# Patient Record
(demographics unavailable — no encounter records)

---

## 2024-12-04 NOTE — HISTORY OF PRESENT ILLNESS
[de-identified] : Patient is s/p left knee patella tendon reconstruction for arthrofibrosis  Has been having anyterior knee pain  NAD Left knee: Incisions healed, c/d/i anterior pf ttp Mild swelling ROM 0-95 Neg Instability NVI Compartments soft and NT  s/p left knee patella tendon reconstruction went over findings explained surgery expained ant knee pain and PF chondromalacia tx options explained will send auth for left knee visco pt script

## 2025-03-24 NOTE — HISTORY OF PRESENT ILLNESS
[TextBox_4] : 62 yo F with PMHx of newly diagnosed DM on Metformin, HTN, TIA, presents with SOBOE, endorses feeling winded and out of breath after climbing 1 flight of stairs, patient able to ambulate on flat surface however walks slow secondary to right knee injury sp 4 surgeries.  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

## 2025-03-24 NOTE — REVIEW OF SYSTEMS
[Recent Wt Gain (___ Lbs)] : ~T recent [unfilled] lb weight gain [Chest Tightness] : no chest tightness [Wheezing] : no wheezing [SOB on Exertion] : sob on exertion [Negative] : Endocrine

## 2025-04-14 NOTE — DISCUSSION/SUMMARY
[FreeTextEntry1] : EKG performed today was unremarkable.  1. Dyspnea on Exertion:  - Recommend an echocardiogram to evaluate LV function and rule out valvular heart disease. - Recommend a pharmacologic nuclear stress test to rule out CAD as etiology of symptoms.   2. TIA/Palpitations: - Patient reports intermittent nondaily palpitations and has a h/o TIA.  - Recommend MCOT monitor to r/o PAF as etiology of symptoms.  - Referral provided to Dr. Pramod Petit () for evaluation of loop recorder.  3. HTN: - Recommend continuing Losartan 50mg PO QD and HCTZ 25mg PO QD. - Recommend keeping BP log at home to assess if any change in antihypertensive regimen is warranted.  - Other planned treatments include an exercise regimen, weight loss, low sodium diet, and alcohol moderation.  4. HLD: - Recommend continuing Lipitor 40mg PO QD. - Other planned treatment includes diet modification, exercise, and weight loss.  5. DM: - There are no changes in medication management. - Patient advised to continue taking medications as prescribed, closely monitor blood sugar at home, follow a diabetic diet, exercise, lose weight, and follow up for continued monitoring. - Discussed importance of diet and exercise to improve glycemic control.  Instructed to follow up after above testing, or sooner for new or worsening symptoms.  Plan was discussed with the patient.

## 2025-04-14 NOTE — ASSESSMENT
[FreeTextEntry1] : NADEEM YUSUF is a pleasant 61-year-old female, with a PMHx significant for HLD, DM, and prior TIA, who presents today for cardiovascular evaluation.

## 2025-04-14 NOTE — CARDIOLOGY SUMMARY
[de-identified] : 04/11/2025: NSR, (-) significant ST-T changes. =======================================================

## 2025-04-14 NOTE — REVIEW OF SYSTEMS
[Chest Discomfort] : no chest discomfort [Lower Ext Edema] : no extremity edema [Leg Claudication] : no intermittent leg claudication [Syncope] : no syncope [Cough] : no cough [FreeTextEntry5] : (+) STAHL, (+) Palpitations [FreeTextEntry6] : (+) STAHL

## 2025-04-14 NOTE — CARDIOLOGY SUMMARY
[de-identified] : 04/11/2025: NSR, (-) significant ST-T changes. =======================================================

## 2025-04-14 NOTE — HISTORY OF PRESENT ILLNESS
[FreeTextEntry1] : NADEEM YUSUF is a pleasant 61-year-old female, with a PMHx significant for HTN, HLD, DM, and prior TIA, who presents today for cardiovascular evaluation. The patient reports a several-month history of STAHL that occurs with walking short distances and up flights of stairs. Denies chest pain or other anginal symptoms during this time. The patient was diagnosed with a TIA in the past for unclear etiology. Also reports intermittent nondaily palpitations. Otherwise: (-) dizziness, (-) syncope.

## 2025-05-10 NOTE — HISTORY OF PRESENT ILLNESS
[FreeTextEntry1] : Cardio: Dr. Alston  61 year old female with recent admission for TIA presenting to discuss long-term rhythm monitoring. She endorses occasional palpitations. A recent MCOT with cardiology showed brief pSVT.    -EKG 5/1/2025: Sinus rhythm at 72 bpm

## 2025-05-10 NOTE — PHYSICAL EXAM
[Well Developed] : well developed [Well Nourished] : well nourished [Normal Conjunctiva] : normal conjunctiva [No Acute Distress] : no acute distress [No Carotid Bruit] : no carotid bruit [Normal Venous Pressure] : normal venous pressure [Normal S1, S2] : normal S1, S2 [No Murmur] : no murmur [No Rub] : no rub [No Gallop] : no gallop [Clear Lung Fields] : clear lung fields [No Respiratory Distress] : no respiratory distress  [Good Air Entry] : good air entry [Soft] : abdomen soft [Non Tender] : non-tender [No Masses/organomegaly] : no masses/organomegaly [Normal Bowel Sounds] : normal bowel sounds [No Edema] : no edema [Normal Gait] : normal gait [No Cyanosis] : no cyanosis [No Clubbing] : no clubbing [No Varicosities] : no varicosities [No Rash] : no rash [No Skin Lesions] : no skin lesions [No Focal Deficits] : no focal deficits [Moves all extremities] : moves all extremities [Normal Speech] : normal speech [Alert and Oriented] : alert and oriented [Normal memory] : normal memory

## 2025-05-10 NOTE — PHYSICAL EXAM
[Well Developed] : well developed [Well Nourished] : well nourished [Normal Conjunctiva] : normal conjunctiva [No Acute Distress] : no acute distress [Normal Venous Pressure] : normal venous pressure [No Carotid Bruit] : no carotid bruit [Normal S1, S2] : normal S1, S2 [No Murmur] : no murmur [No Gallop] : no gallop [No Rub] : no rub [Clear Lung Fields] : clear lung fields [Good Air Entry] : good air entry [No Respiratory Distress] : no respiratory distress  [Soft] : abdomen soft [Non Tender] : non-tender [Normal Bowel Sounds] : normal bowel sounds [No Masses/organomegaly] : no masses/organomegaly [No Edema] : no edema [Normal Gait] : normal gait [No Clubbing] : no clubbing [No Cyanosis] : no cyanosis [No Varicosities] : no varicosities [No Rash] : no rash [No Skin Lesions] : no skin lesions [No Focal Deficits] : no focal deficits [Moves all extremities] : moves all extremities [Alert and Oriented] : alert and oriented [Normal Speech] : normal speech [Normal memory] : normal memory

## 2025-05-10 NOTE — ASSESSMENT
[FreeTextEntry1] : The risks, benefits and alternatives of a loop recorder for long-term rhythm monitoring were discussed in details. I explained to the patient that if Afib is detected she will need to start anti-coagulation. We discussed the associated co-pays. All questions answered. Patient would like to consult with her family before proceeding.  Follow-up in 1 months or earlier as needed.

## 2025-05-12 NOTE — HISTORY OF PRESENT ILLNESS
[TextBox_4] : 5/12/25: Patient states she has been doing well, has not needed to reach for her albuterol. Continues to endorse SOBOE when climbing stairs only. SP HST  3/24/25: 62 yo F with PMHx of newly diagnosed DM on Metformin, HTN, TIA, presents with SOBOE, endorses feeling winded and out of breath after climbing 1 flight of stairs, patient able to ambulate on flat surface however walks slow secondary to right knee injury sp 4 surgeries.  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

## 2025-06-09 NOTE — HISTORY OF PRESENT ILLNESS
[Obstructive Sleep Apnea] : obstructive sleep apnea [Awakes Unrefreshed] : awakes unrefreshed [Fatigue] : fatigue [Recent  Weight Gain] : recent weight gain [Snoring] : snoring [TextBox_4] : 6/9/25: Patient SP titration study states it was the best sleep of her life, denies respiratory complaints, new insurance as of June 1st. CT Chest denied. Has not needed to reach for her albuterol though when she climbs the stairs quickly would experience SOB, SP recent stress test negative pending follow up with Cardiology.   5/12/25: Patient states she has been doing well, has not needed to reach for her albuterol. Continues to endorse SOBOE when climbing stairs only. SP HST  3/24/25: 62 yo F with PMHx of newly diagnosed DM on Metformin, HTN, TIA, presents with SOBOE, endorses feeling winded and out of breath after climbing 1 flight of stairs, patient able to ambulate on flat surface however walks slow secondary to right knee injury sp 4 surgeries.  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

## 2025-06-09 NOTE — PHYSICAL EXAM
[No Acute Distress] : no acute distress [Normal Oropharynx] : normal oropharynx [Normal Appearance] : normal appearance [Normal Rate/Rhythm] : normal rate/rhythm [No Neck Mass] : no neck mass [Normal S1, S2] : normal s1, s2 [No Murmurs] : no murmurs [No Resp Distress] : no resp distress [Clear to Auscultation Bilaterally] : clear to auscultation bilaterally [No Abnormalities] : no abnormalities [Normal Gait] : normal gait [Benign] : benign [No Cyanosis] : no cyanosis [No Clubbing] : no clubbing [No Edema] : no edema [Normal Color/ Pigmentation] : normal color/ pigmentation [FROM] : FROM [No Focal Deficits] : no focal deficits [Oriented x3] : oriented x3 [Normal Affect] : normal affect [TextBox_68] : no wheezing

## 2025-06-09 NOTE — REVIEW OF SYSTEMS
[Recent Wt Gain (___ Lbs)] : ~T recent [unfilled] lb weight gain [SOB on Exertion] : sob on exertion [Negative] : Endocrine [Chest Tightness] : no chest tightness [Wheezing] : no wheezing

## 2025-06-25 NOTE — HISTORY OF PRESENT ILLNESS
[FreeTextEntry1] : NADEEM YUSUF is a pleasant 61-year-old female, with a PMHx significant for HTN, HLD, DM, and prior TIA, who presents today for cardiovascular follow up. The patient reports a several-month history of STAHL that occurs with walking short distances and up flights of stairs and underwent an echocardiogram demonstrating normal LV function and a nuclear stress test that demonstrated no ischemia to evaluate her symptoms.  She was seen by EP but declined ILR implantation.  She continues to decline ILR implantation at this time.  She has no cardiac complaints.

## 2025-06-25 NOTE — REVIEW OF SYSTEMS
[SOB] : no shortness of breath [Dyspnea on exertion] : not dyspnea during exertion [Chest Discomfort] : no chest discomfort [Lower Ext Edema] : no extremity edema [Leg Claudication] : no intermittent leg claudication [Palpitations] : no palpitations [Orthopnea] : no orthopnea [PND] : no PND [Syncope] : no syncope [Cough] : no cough [Negative] : Heme/Lymph [FreeTextEntry6] : (+) STAHL

## 2025-06-25 NOTE — DISCUSSION/SUMMARY
[FreeTextEntry1] : EKG performed today was unremarkable.  1. Dyspnea on Exertion:  The patient underwent an echocardiogram demonstrating normal LV function. Her recent nuclear stress test demonstrated no ischemia and normal perfusion. Given the above no further ischemic evaluation is indicated at this time.  2. TIA/Palpitations: - Given her history of TIA she was referred to EP for implantable loop recorder. She continues to decline ILR implantation at this time. If she changes her mind she was instructed to follow-up with the EP for the above.  3. HTN: - Recommend continuing Losartan 50mg PO QD and HCTZ 25mg PO QD. - Recommend keeping BP log at home to assess if any change in antihypertensive regimen is warranted.  - Other planned treatments include an exercise regimen, weight loss, low sodium diet, and alcohol moderation.  4. HLD: - Recommend continuing Lipitor 40mg PO QD. - Other planned treatment includes diet modification, exercise, and weight loss.  5. DM: - There are no changes in medication management. - Patient advised to continue taking medications as prescribed, closely monitor blood sugar at home, follow a diabetic diet, exercise, lose weight, and follow up for continued monitoring. - Discussed importance of diet and exercise to improve glycemic control.  Instructed to follow up in 6 months or sooner for new or worsening symptoms.  Plan was discussed with the patient. [EKG obtained to assist in diagnosis and management of assessed problem(s)] : EKG obtained to assist in diagnosis and management of assessed problem(s)

## 2025-06-25 NOTE — CARDIOLOGY SUMMARY
[de-identified] : 06-: Sinus rhythm no ischemic ST-T changes. 04/11/2025: NSR, (-) significant ST-T changes. =======================================================